# Patient Record
Sex: FEMALE | URBAN - METROPOLITAN AREA
[De-identification: names, ages, dates, MRNs, and addresses within clinical notes are randomized per-mention and may not be internally consistent; named-entity substitution may affect disease eponyms.]

---

## 2019-02-06 ENCOUNTER — IMPORTED ENCOUNTER (OUTPATIENT)
Dept: URBAN - METROPOLITAN AREA CLINIC 31 | Facility: CLINIC | Age: 29
End: 2019-02-06

## 2019-02-06 PROBLEM — H44.23: Noted: 2019-02-06

## 2019-02-06 NOTE — PATIENT DISCUSSION
Myopic degeneration mildRetinal periphery stable higher risk of retinal tears and detachment call immediately if develops new flashes or floaters. Not a candidate for Lasik too  high correction for corneal thickness. Options discussed. RLE with toric IOL or toric MFIOL  risks reviewed infection bleeding loss of vision retinal tears detachment. Phakic toric IOL advantage would be preservation of reading vision but higher risk of cataract formation. Refer to Dr Chela Alexander if interested. Return for an appointment for [address] [place] [address] with Dr. Liz Romeo.

## 2022-04-01 ASSESSMENT — VISUAL ACUITY
OS_SC: 20/40
OD_PH: CC 20/30 +2
OD_SC: 20/50+2
OS_PH: CC 20/30 +2

## 2022-04-01 ASSESSMENT — TONOMETRY
OD_IOP_MMHG: 15
OS_IOP_MMHG: 14